# Patient Record
Sex: MALE | Race: WHITE
[De-identification: names, ages, dates, MRNs, and addresses within clinical notes are randomized per-mention and may not be internally consistent; named-entity substitution may affect disease eponyms.]

---

## 2018-11-01 ENCOUNTER — HOSPITAL ENCOUNTER (EMERGENCY)
Dept: HOSPITAL 19 - COL.ER | Age: 23
Discharge: HOME | End: 2018-11-01
Payer: MEDICAID

## 2018-11-01 VITALS — TEMPERATURE: 99.1 F | DIASTOLIC BLOOD PRESSURE: 84 MMHG | SYSTOLIC BLOOD PRESSURE: 148 MMHG

## 2018-11-01 VITALS — HEIGHT: 67.99 IN | BODY MASS INDEX: 34.92 KG/M2 | WEIGHT: 230.38 LBS

## 2018-11-01 VITALS — HEART RATE: 116 BPM

## 2018-11-01 DIAGNOSIS — N45.1: Primary | ICD-10-CM

## 2018-11-01 LAB
BASOPHILS # BLD: 0.1 10*3/UL (ref 0–0.2)
BASOPHILS NFR BLD AUTO: 0.3 % (ref 0–2)
EOSINOPHIL # BLD: 0.2 10*3/UL (ref 0–0.7)
EOSINOPHIL NFR BLD: 0.8 % (ref 0–4)
ERYTHROCYTE [DISTWIDTH] IN BLOOD BY AUTOMATED COUNT: 13.5 % (ref 11.5–14.5)
GRANULOCYTES # BLD AUTO: 79.9 % (ref 42.2–75.2)
HCT VFR BLD AUTO: 46.2 % (ref 42–52)
HGB BLD-MCNC: 15.2 G/DL (ref 13.5–18)
LYMPHOCYTES # BLD: 1.9 10*3/UL (ref 1.2–3.4)
LYMPHOCYTES NFR BLD: 9.2 % (ref 20–51)
MCH RBC QN AUTO: 28 PG (ref 27–31)
MCHC RBC AUTO-ENTMCNC: 33 G/DL (ref 33–37)
MCV RBC AUTO: 86 FL (ref 80–100)
MONOCYTES # BLD: 1.9 10*3/UL (ref 0.1–0.6)
MONOCYTES NFR BLD AUTO: 9.4 % (ref 1.7–9.3)
NEUTROPHILS # BLD: 16.4 10*3/UL (ref 1.4–6.5)
PH UR STRIP.AUTO: 6 [PH] (ref 5–8)
PLATELET # BLD AUTO: 261 K/MM3 (ref 130–400)
PMV BLD AUTO: 9.9 FL (ref 7.4–10.4)
RBC # BLD AUTO: 5.36 M/MM3 (ref 4.2–5.6)
RBC # UR: (no result) /HPF
SP GR UR STRIP.AUTO: 1.03 (ref 1–1.03)
URN COLLECT METHOD CLASS: (no result)
UROBILINOGEN UR STRIP.AUTO-MCNC: 2 MG/DL

## 2019-02-08 ENCOUNTER — HOSPITAL ENCOUNTER (EMERGENCY)
Dept: HOSPITAL 19 - COL.ER | Age: 24
Discharge: HOME | End: 2019-02-08
Payer: SELF-PAY

## 2019-02-08 VITALS — HEART RATE: 97 BPM | DIASTOLIC BLOOD PRESSURE: 98 MMHG | SYSTOLIC BLOOD PRESSURE: 120 MMHG

## 2019-02-08 VITALS — HEIGHT: 67.99 IN | WEIGHT: 220.46 LBS | BODY MASS INDEX: 33.41 KG/M2

## 2019-02-08 VITALS — TEMPERATURE: 97.9 F
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
## 2019-02-08 DIAGNOSIS — S40.011A: Primary | ICD-10-CM
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
## 2019-04-11 ENCOUNTER — HOSPITAL ENCOUNTER (EMERGENCY)
Dept: HOSPITAL 19 - COL.ER | Age: 24
Discharge: HOME | End: 2019-04-11
Payer: SELF-PAY

## 2019-04-11 VITALS — TEMPERATURE: 98.7 F | SYSTOLIC BLOOD PRESSURE: 147 MMHG | DIASTOLIC BLOOD PRESSURE: 86 MMHG

## 2019-04-11 VITALS — BODY MASS INDEX: 33.41 KG/M2 | WEIGHT: 220.46 LBS | HEIGHT: 67.99 IN

## 2019-04-11 VITALS — HEART RATE: 89 BPM
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
## 2019-04-11 DIAGNOSIS — F17.210: ICD-10-CM

## 2019-04-11 DIAGNOSIS — Z23: ICD-10-CM
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
## 2019-04-11 DIAGNOSIS — S80.212A: ICD-10-CM

## 2019-04-11 DIAGNOSIS — F90.9: ICD-10-CM

## 2019-04-11 DIAGNOSIS — S50.312A: ICD-10-CM
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
## 2019-04-11 DIAGNOSIS — S01.01XA: Primary | ICD-10-CM

## 2021-05-13 ENCOUNTER — HOSPITAL ENCOUNTER (EMERGENCY)
Dept: HOSPITAL 19 - COL.ER | Age: 26
Discharge: HOME | End: 2021-05-13
Attending: EMERGENCY MEDICINE
Payer: SELF-PAY

## 2021-05-13 VITALS — HEART RATE: 66 BPM | SYSTOLIC BLOOD PRESSURE: 139 MMHG | DIASTOLIC BLOOD PRESSURE: 116 MMHG

## 2021-05-13 VITALS — BODY MASS INDEX: 33.41 KG/M2 | WEIGHT: 220.46 LBS | HEIGHT: 67.99 IN

## 2021-05-13 VITALS — TEMPERATURE: 98.1 F

## 2021-05-13 DIAGNOSIS — R74.01: ICD-10-CM

## 2021-05-13 DIAGNOSIS — R74.8: ICD-10-CM

## 2021-05-13 DIAGNOSIS — K80.80: Primary | ICD-10-CM

## 2021-05-13 DIAGNOSIS — F17.210: ICD-10-CM

## 2021-05-13 DIAGNOSIS — Z88.1: ICD-10-CM

## 2021-05-13 LAB
ALBUMIN SERPL-MCNC: 4.4 GM/DL (ref 3.5–5)
ALP SERPL-CCNC: 91 U/L (ref 50–136)
ALT SERPL-CCNC: 46 U/L (ref 4–49)
ANION GAP SERPL CALC-SCNC: 9 MMOL/L (ref 7–16)
AST SERPL-CCNC: 55 U/L (ref 15–37)
BASOPHILS # BLD: 0.1 10*3/UL (ref 0–0.2)
BASOPHILS NFR BLD AUTO: 0.8 % (ref 0–2)
BILIRUB SERPL-MCNC: 0.7 MG/DL (ref 0–1)
BUN SERPL-MCNC: 10 MG/DL (ref 9–20)
CALCIUM SERPL-MCNC: 9.3 MG/DL (ref 8.4–10.2)
CHLORIDE SERPL-SCNC: 104 MMOL/L (ref 98–107)
CO2 SERPL-SCNC: 23 MMOL/L (ref 22–30)
CREAT SERPL-SCNC: 0.72 UMOL/L (ref 0.66–1.25)
EOSINOPHIL # BLD: 0.4 10*3/UL (ref 0–0.7)
EOSINOPHIL NFR BLD: 4.7 % (ref 0–4)
ERYTHROCYTE [DISTWIDTH] IN BLOOD BY AUTOMATED COUNT: 13.7 % (ref 11.5–14.5)
GLUCOSE SERPL-MCNC: 113 MG/DL (ref 74–106)
GRANULOCYTES # BLD AUTO: 66.4 % (ref 42.2–75.2)
HCT VFR BLD AUTO: 46.2 % (ref 42–52)
HGB BLD-MCNC: 15.4 G/DL (ref 13.5–18)
LIPASE SERPL-CCNC: 407 U/L (ref 23–300)
LYMPHOCYTES # BLD: 1.7 10*3/UL (ref 1.2–3.4)
LYMPHOCYTES NFR BLD: 18.9 % (ref 20–51)
MCH RBC QN AUTO: 29 PG (ref 27–31)
MCHC RBC AUTO-ENTMCNC: 33 G/DL (ref 33–37)
MCV RBC AUTO: 86 FL (ref 80–100)
MONOCYTES # BLD: 0.8 10*3/UL (ref 0.1–0.6)
MONOCYTES NFR BLD AUTO: 8.9 % (ref 1.7–9.3)
NEUTROPHILS # BLD: 6 10*3/UL (ref 1.4–6.5)
PH UR STRIP.AUTO: 7 [PH] (ref 5–8)
PLATELET # BLD AUTO: 222 K/MM3 (ref 130–400)
PMV BLD AUTO: 9.6 FL (ref 7.4–10.4)
POTASSIUM SERPL-SCNC: 4.2 MMOL/L (ref 3.4–5)
PROT SERPL-MCNC: 7.9 GM/DL (ref 6.4–8.2)
RBC # BLD AUTO: 5.36 M/MM3 (ref 4.2–5.6)
RBC # UR: (no result) /HPF
SODIUM SERPL-SCNC: 136 MMOL/L (ref 137–145)
SP GR UR STRIP.AUTO: 1.02 (ref 1–1.03)
UA DIPSTICK PNL UR STRIP.AUTO: (no result)
URN COLLECT METHOD CLASS: (no result)
UROBILINOGEN UR STRIP.AUTO-MCNC: 2 MG/DL

## 2021-05-21 ENCOUNTER — HOSPITAL ENCOUNTER (OUTPATIENT)
Dept: HOSPITAL 19 - SDCO | Age: 26
Discharge: HOME | End: 2021-05-21
Attending: SURGERY
Payer: SELF-PAY

## 2021-05-21 VITALS — HEART RATE: 89 BPM | SYSTOLIC BLOOD PRESSURE: 160 MMHG | DIASTOLIC BLOOD PRESSURE: 94 MMHG

## 2021-05-21 VITALS — HEART RATE: 91 BPM | SYSTOLIC BLOOD PRESSURE: 151 MMHG | DIASTOLIC BLOOD PRESSURE: 90 MMHG

## 2021-05-21 VITALS — DIASTOLIC BLOOD PRESSURE: 97 MMHG | TEMPERATURE: 97.7 F | SYSTOLIC BLOOD PRESSURE: 148 MMHG | HEART RATE: 83 BPM

## 2021-05-21 VITALS — WEIGHT: 221.12 LBS | HEIGHT: 68 IN | BODY MASS INDEX: 33.51 KG/M2

## 2021-05-21 VITALS — SYSTOLIC BLOOD PRESSURE: 136 MMHG | TEMPERATURE: 97.7 F | DIASTOLIC BLOOD PRESSURE: 79 MMHG | HEART RATE: 62 BPM

## 2021-05-21 VITALS — DIASTOLIC BLOOD PRESSURE: 101 MMHG | SYSTOLIC BLOOD PRESSURE: 151 MMHG | HEART RATE: 92 BPM

## 2021-05-21 DIAGNOSIS — F17.210: ICD-10-CM

## 2021-05-21 DIAGNOSIS — K80.10: Primary | ICD-10-CM

## 2021-05-21 DIAGNOSIS — Z20.822: ICD-10-CM

## 2021-05-21 LAB
ALBUMIN SERPL-MCNC: 4.2 GM/DL (ref 3.5–5)
ALP SERPL-CCNC: 74 U/L (ref 50–136)
ALT SERPL-CCNC: 36 U/L (ref 4–49)
ANION GAP SERPL CALC-SCNC: 5 MMOL/L (ref 7–16)
AST SERPL-CCNC: 32 U/L (ref 15–37)
BASOPHILS # BLD: 0.1 10*3/UL (ref 0–0.2)
BASOPHILS NFR BLD AUTO: 0.8 % (ref 0–2)
BILIRUB SERPL-MCNC: 0.7 MG/DL (ref 0–1)
BUN SERPL-MCNC: 15 MG/DL (ref 9–20)
CALCIUM SERPL-MCNC: 9.1 MG/DL (ref 8.4–10.2)
CHLORIDE SERPL-SCNC: 108 MMOL/L (ref 98–107)
CO2 SERPL-SCNC: 26 MMOL/L (ref 22–30)
CREAT SERPL-SCNC: 0.79 UMOL/L (ref 0.66–1.25)
EOSINOPHIL # BLD: 0.3 10*3/UL (ref 0–0.7)
EOSINOPHIL NFR BLD: 3.8 % (ref 0–4)
ERYTHROCYTE [DISTWIDTH] IN BLOOD BY AUTOMATED COUNT: 14.1 % (ref 11.5–14.5)
GLUCOSE SERPL-MCNC: 97 MG/DL (ref 74–106)
GRANULOCYTES # BLD AUTO: 62.8 % (ref 42.2–75.2)
HCT VFR BLD AUTO: 43.9 % (ref 42–52)
HGB BLD-MCNC: 14.1 G/DL (ref 13.5–18)
LIPASE SERPL-CCNC: 307 U/L (ref 23–300)
LYMPHOCYTES # BLD: 1.8 10*3/UL (ref 1.2–3.4)
LYMPHOCYTES NFR BLD: 21.1 % (ref 20–51)
MCH RBC QN AUTO: 29 PG (ref 27–31)
MCHC RBC AUTO-ENTMCNC: 32 G/DL (ref 33–37)
MCV RBC AUTO: 90 FL (ref 80–100)
MONOCYTES # BLD: 1 10*3/UL (ref 0.1–0.6)
MONOCYTES NFR BLD AUTO: 11.3 % (ref 1.7–9.3)
NEUTROPHILS # BLD: 5.5 10*3/UL (ref 1.4–6.5)
PLATELET # BLD AUTO: 278 K/MM3 (ref 130–400)
PMV BLD AUTO: 9.9 FL (ref 7.4–10.4)
POTASSIUM SERPL-SCNC: 4.4 MMOL/L (ref 3.4–5)
PROT SERPL-MCNC: 7.4 GM/DL (ref 6.4–8.2)
RBC # BLD AUTO: 4.88 M/MM3 (ref 4.2–5.6)
SODIUM SERPL-SCNC: 139 MMOL/L (ref 137–145)

## 2021-05-21 NOTE — NUR
Pt to [facility] [place] bay 5 via cart from PACU. Pt awake and alert. Denies pain or nausea.
Bandaids x4 to abdomen are clean, dry, and intact. Pudding and water given per
pt request. Mother in room. Will continue to monitor. Call light within reach.

## 2021-10-06 ENCOUNTER — HOSPITAL ENCOUNTER (EMERGENCY)
Dept: HOSPITAL 19 - COL.ER | Age: 26
Discharge: HOME | End: 2021-10-06
Attending: EMERGENCY MEDICINE
Payer: SELF-PAY

## 2021-10-06 VITALS — TEMPERATURE: 97.9 F

## 2021-10-06 VITALS — DIASTOLIC BLOOD PRESSURE: 119 MMHG | SYSTOLIC BLOOD PRESSURE: 162 MMHG | HEART RATE: 96 BPM

## 2021-10-06 VITALS — HEIGHT: 67.99 IN | BODY MASS INDEX: 32.64 KG/M2 | WEIGHT: 215.39 LBS

## 2021-10-06 DIAGNOSIS — F17.200: ICD-10-CM

## 2021-10-06 DIAGNOSIS — K85.90: Primary | ICD-10-CM

## 2021-10-06 DIAGNOSIS — Z20.822: ICD-10-CM

## 2021-10-06 LAB
ALBUMIN SERPL-MCNC: 4.3 GM/DL (ref 3.5–5)
ALP SERPL-CCNC: 97 U/L (ref 0–750)
ALT SERPL-CCNC: 41 U/L (ref 0–55)
ANION GAP SERPL CALC-SCNC: 11 MMOL/L (ref 7–16)
AST SERPL-CCNC: 24 U/L (ref 5–34)
BASOPHILS # BLD: 0.1 K/MM3 (ref 0–0.2)
BASOPHILS NFR BLD AUTO: 0.4 % (ref 0–2)
BILIRUB SERPL-MCNC: 1.3 MG/DL (ref 0.2–1.2)
BUN SERPL-MCNC: 12 MG/DL (ref 9–21)
CALCIUM SERPL-MCNC: 10.2 MG/DL (ref 8.4–10.2)
CHLORIDE SERPL-SCNC: 106 MMOL/L (ref 98–107)
CHOLEST SPEC-SCNC: 161 MG/DL (ref 0–199)
CHOLEST/HDLC SERPL-SRTO: 2.9
CO2 SERPL-SCNC: 21 MMOL/L (ref 22–29)
CREAT SERPL-SCNC: 0.88 MG/DL (ref 0.72–1.25)
EOSINOPHIL # BLD: 0.2 K/MM3 (ref 0–0.7)
EOSINOPHIL NFR BLD: 1.6 % (ref 0–4)
ERYTHROCYTE [DISTWIDTH] IN BLOOD BY AUTOMATED COUNT: 13.9 % (ref 11.5–14.5)
GLUCOSE SERPL-MCNC: 111 MG/DL (ref 70–99)
GRANULOCYTES # BLD AUTO: 76.8 % (ref 42.2–75.2)
HCT VFR BLD AUTO: 47.5 % (ref 42–52)
HDLC SERPL-MCNC: 55 MG/DL (ref 40–60)
HGB BLD-MCNC: 15.4 G/DL (ref 13.5–18)
LDLC SERPL-MCNC: 63 MG/DL
LIPASE SERPL-CCNC: 692 U/L (ref 8–78)
LYMPHOCYTES # BLD: 1.6 K/MM3 (ref 1.2–3.4)
LYMPHOCYTES NFR BLD: 11.7 % (ref 20–51)
MCH RBC QN AUTO: 29 PG (ref 27–31)
MCHC RBC AUTO-ENTMCNC: 32 G/DL (ref 33–37)
MCV RBC AUTO: 88 FL (ref 80–100)
MONOCYTES # BLD: 1.2 K/MM3 (ref 0.1–0.6)
MONOCYTES NFR BLD AUTO: 9.2 % (ref 1.7–9.3)
NEUTROPHILS # BLD: 10.2 K/MM3 (ref 1.4–6.5)
PH UR STRIP.AUTO: 6 [PH] (ref 5–8)
PLATELET # BLD AUTO: 255 K/MM3 (ref 130–400)
PMV BLD AUTO: 9.8 FL (ref 7.4–10.4)
POTASSIUM SERPL-SCNC: 4.1 MMOL/L (ref 3.5–4.5)
PROT SERPL-MCNC: 7.7 GM/DL (ref 6.2–8.1)
RBC # BLD AUTO: 5.4 M/MM3 (ref 4.2–5.6)
RBC # UR: (no result) /HPF
SODIUM SERPL-SCNC: 138 MMOL/L (ref 136–145)
SP GR UR STRIP.AUTO: 1.04 (ref 1–1.03)
SQUAMOUS # URNS: (no result) /HPF
TRIGL SERPL-MCNC: 215 MG/DL (ref 0–149)
URN COLLECT METHOD CLASS: (no result)

## 2021-11-15 ENCOUNTER — HOSPITAL ENCOUNTER (INPATIENT)
Dept: HOSPITAL 19 - COL.ER | Age: 26
LOS: 4 days | Discharge: LEFT BEFORE DISCHARGE | DRG: 439 | End: 2021-11-19
Attending: INTERNAL MEDICINE | Admitting: INTERNAL MEDICINE
Payer: SELF-PAY

## 2021-11-15 VITALS — WEIGHT: 215.39 LBS | BODY MASS INDEX: 32.64 KG/M2 | HEIGHT: 67.99 IN

## 2021-11-15 VITALS — TEMPERATURE: 98.6 F | HEART RATE: 129 BPM | DIASTOLIC BLOOD PRESSURE: 108 MMHG | SYSTOLIC BLOOD PRESSURE: 166 MMHG

## 2021-11-15 VITALS — DIASTOLIC BLOOD PRESSURE: 98 MMHG | SYSTOLIC BLOOD PRESSURE: 155 MMHG | HEART RATE: 119 BPM | TEMPERATURE: 99.1 F

## 2021-11-15 DIAGNOSIS — K85.20: Primary | ICD-10-CM

## 2021-11-15 DIAGNOSIS — E87.6: ICD-10-CM

## 2021-11-15 DIAGNOSIS — F17.210: ICD-10-CM

## 2021-11-15 DIAGNOSIS — R00.0: ICD-10-CM

## 2021-11-15 DIAGNOSIS — R18.8: ICD-10-CM

## 2021-11-15 DIAGNOSIS — R19.7: ICD-10-CM

## 2021-11-15 DIAGNOSIS — F10.139: ICD-10-CM

## 2021-11-15 DIAGNOSIS — J90: ICD-10-CM

## 2021-11-15 LAB
ALBUMIN SERPL-MCNC: 4.4 GM/DL (ref 3.5–5)
ALP SERPL-CCNC: 97 U/L (ref 40–150)
ALT SERPL-CCNC: 22 U/L (ref 0–55)
ANION GAP SERPL CALC-SCNC: 17 MMOL/L (ref 7–16)
AST SERPL-CCNC: 22 U/L (ref 5–34)
BASOPHILS # BLD: 0.1 K/MM3 (ref 0–0.2)
BASOPHILS NFR BLD AUTO: 0.4 % (ref 0–2)
BILIRUB SERPL-MCNC: 2.1 MG/DL (ref 0.2–1.2)
BUN SERPL-MCNC: 14 MG/DL (ref 9–21)
CALCIUM SERPL-MCNC: 10.2 MG/DL (ref 8.4–10.2)
CHLORIDE SERPL-SCNC: 106 MMOL/L (ref 98–107)
CO2 SERPL-SCNC: 17 MMOL/L (ref 22–29)
CREAT SERPL-SCNC: 0.93 MG/DL (ref 0.72–1.25)
CRP SERPL-MCNC: 3.2 MG/DL (ref 0–0.5)
EOSINOPHIL # BLD: 0.1 K/MM3 (ref 0–0.7)
EOSINOPHIL NFR BLD: 0.3 % (ref 0–4)
ERYTHROCYTE [DISTWIDTH] IN BLOOD BY AUTOMATED COUNT: 14.4 % (ref 11.5–14.5)
GLUCOSE SERPL-MCNC: 138 MG/DL (ref 70–99)
GRANULOCYTES # BLD AUTO: 86.4 % (ref 42.2–75.2)
HCT VFR BLD AUTO: 50.9 % (ref 42–52)
HGB BLD-MCNC: 16.9 G/DL (ref 13.5–18)
LYMPHOCYTES # BLD: 1.5 K/MM3 (ref 1.2–3.4)
LYMPHOCYTES NFR BLD: 6.6 % (ref 20–51)
MCH RBC QN AUTO: 29 PG (ref 27–31)
MCHC RBC AUTO-ENTMCNC: 33 G/DL (ref 33–37)
MCV RBC AUTO: 86 FL (ref 80–100)
MONOCYTES # BLD: 1.3 K/MM3 (ref 0.1–0.6)
MONOCYTES NFR BLD AUTO: 5.8 % (ref 1.7–9.3)
NEUTROPHILS # BLD: 19.6 K/MM3 (ref 1.4–6.5)
PLATELET # BLD AUTO: 312 K/MM3 (ref 130–400)
PMV BLD AUTO: 9.5 FL (ref 7.4–10.4)
POTASSIUM SERPL-SCNC: 3.1 MMOL/L (ref 3.5–4.5)
PROT SERPL-MCNC: 8.3 GM/DL (ref 6.2–8.1)
RBC # BLD AUTO: 5.94 M/MM3 (ref 4.2–5.6)
SODIUM SERPL-SCNC: 140 MMOL/L (ref 136–145)

## 2021-11-16 VITALS — HEART RATE: 116 BPM | DIASTOLIC BLOOD PRESSURE: 99 MMHG | SYSTOLIC BLOOD PRESSURE: 141 MMHG | TEMPERATURE: 98.7 F

## 2021-11-16 VITALS — HEART RATE: 138 BPM | TEMPERATURE: 98.7 F | DIASTOLIC BLOOD PRESSURE: 118 MMHG | SYSTOLIC BLOOD PRESSURE: 161 MMHG

## 2021-11-16 VITALS — SYSTOLIC BLOOD PRESSURE: 160 MMHG | TEMPERATURE: 98.1 F | HEART RATE: 124 BPM | DIASTOLIC BLOOD PRESSURE: 101 MMHG

## 2021-11-16 VITALS — SYSTOLIC BLOOD PRESSURE: 150 MMHG | HEART RATE: 122 BPM | DIASTOLIC BLOOD PRESSURE: 91 MMHG | TEMPERATURE: 98.3 F

## 2021-11-16 VITALS — TEMPERATURE: 100.8 F | SYSTOLIC BLOOD PRESSURE: 167 MMHG | HEART RATE: 137 BPM | DIASTOLIC BLOOD PRESSURE: 109 MMHG

## 2021-11-16 VITALS — SYSTOLIC BLOOD PRESSURE: 147 MMHG | TEMPERATURE: 99.5 F | HEART RATE: 129 BPM | DIASTOLIC BLOOD PRESSURE: 90 MMHG

## 2021-11-16 LAB
ANION GAP SERPL CALC-SCNC: 11 MMOL/L (ref 7–16)
BASOPHILS # BLD: 0.1 K/MM3 (ref 0–0.2)
BASOPHILS NFR BLD AUTO: 0.3 % (ref 0–2)
BUN SERPL-MCNC: 8 MG/DL (ref 9–21)
CALCIUM SERPL-MCNC: 8.8 MG/DL (ref 8.4–10.2)
CHLORIDE SERPL-SCNC: 106 MMOL/L (ref 98–107)
CO2 SERPL-SCNC: 21 MMOL/L (ref 22–29)
CREAT SERPL-SCNC: 0.85 MG/DL (ref 0.72–1.25)
EOSINOPHIL # BLD: 0 K/MM3 (ref 0–0.7)
EOSINOPHIL NFR BLD: 0 % (ref 0–4)
ERYTHROCYTE [DISTWIDTH] IN BLOOD BY AUTOMATED COUNT: 14.6 % (ref 11.5–14.5)
GLUCOSE SERPL-MCNC: 121 MG/DL (ref 70–99)
GRANULOCYTES # BLD AUTO: 89.1 % (ref 42.2–75.2)
HCT VFR BLD AUTO: 47.3 % (ref 42–52)
HGB BLD-MCNC: 15.5 G/DL (ref 13.5–18)
LYMPHOCYTES # BLD: 0.7 K/MM3 (ref 1.2–3.4)
LYMPHOCYTES NFR BLD: 3.6 % (ref 20–51)
MCH RBC QN AUTO: 28 PG (ref 27–31)
MCHC RBC AUTO-ENTMCNC: 33 G/DL (ref 33–37)
MCV RBC AUTO: 86 FL (ref 80–100)
MONOCYTES # BLD: 1.2 K/MM3 (ref 0.1–0.6)
MONOCYTES NFR BLD AUTO: 6.4 % (ref 1.7–9.3)
NEUTROPHILS # BLD: 16.5 K/MM3 (ref 1.4–6.5)
PLATELET # BLD AUTO: 225 K/MM3 (ref 130–400)
PMV BLD AUTO: 9.8 FL (ref 7.4–10.4)
POTASSIUM SERPL-SCNC: 4.6 MMOL/L (ref 3.5–4.5)
RBC # BLD AUTO: 5.51 M/MM3 (ref 4.2–5.6)
SODIUM SERPL-SCNC: 138 MMOL/L (ref 136–145)

## 2021-11-16 NOTE — NUR
PT WAS IN AND OUT OF SLEEP ALL NIGHT. PT REMAINED HYPERTENSIVE. PT PAIN
REMAINS HIGH AND REQUIRES THE EVERY 2 HOURS DILIUADID. PT FINISHED THE BAGS OF
POTASSIUM, HAS BANANA BAG RUNNING CURRENTLY. NS WILL BE MAINTENANCE FLUIDS
AFTER BANANA BAG FINISHES. WILL TELL DAY SHIFT RN THAT BAGS WILL NEED RETIMED.
PT STATES VOMIT IS NOW ONLY SPIT, BUT STILL NAUSEOUS. DENIES ZOFRAN AT THIS
TIME. THIS RN OFFERED BROTH TO PT, PT DENIED. PT IS PLEASANT, TOOK MEDICATIONS
AS ORDERED.

## 2021-11-16 NOTE — NUR
Evening vital signs not WNL, HR 130s-140s, BP elevated, and temp elevated. POLO Hall notified on telephone, new orders obtained.

## 2021-11-16 NOTE — NUR
Report received from MEGAN Betancourt. Pt. resting in bed w/ eyes closed at this time.
Call light and belongings in reach.

## 2021-11-16 NOTE — NUR
met with patient to discuss discharge plan. Patient states that
he lives with his parents at their home in [place]. Patient is fully
independent with his activities of daily living and does not utilize any
medical equipment to assist with mobility. Patient has no O2 needs. PCP is
 at [facility] and he utilizes [facility] for
perscriptions. Patient reports that he does not have trouble affording his
medications. Patient is unmarried and has no children. Education provided to
the patient surrounding what a DPOA-HC is and that if he was not to fill out a
form that health care decisions would fall to his parents. Patient verbalized
his understanding of this and does not wish to complete form at this time.
Patient is not interested in treatment for alcohol stating " ican stop on my
own". Patient is planning on returning home post dc.
 
Discharge plan: Home with parents.

## 2021-11-17 VITALS — HEART RATE: 137 BPM | TEMPERATURE: 98.4 F | DIASTOLIC BLOOD PRESSURE: 107 MMHG | SYSTOLIC BLOOD PRESSURE: 179 MMHG

## 2021-11-17 VITALS — SYSTOLIC BLOOD PRESSURE: 174 MMHG | TEMPERATURE: 99.4 F | HEART RATE: 122 BPM | DIASTOLIC BLOOD PRESSURE: 97 MMHG

## 2021-11-17 VITALS — HEART RATE: 138 BPM | TEMPERATURE: 98.5 F | SYSTOLIC BLOOD PRESSURE: 155 MMHG | DIASTOLIC BLOOD PRESSURE: 111 MMHG

## 2021-11-17 VITALS — DIASTOLIC BLOOD PRESSURE: 94 MMHG | SYSTOLIC BLOOD PRESSURE: 142 MMHG | TEMPERATURE: 99 F | HEART RATE: 146 BPM

## 2021-11-17 VITALS — DIASTOLIC BLOOD PRESSURE: 110 MMHG | TEMPERATURE: 99 F | SYSTOLIC BLOOD PRESSURE: 162 MMHG | HEART RATE: 134 BPM

## 2021-11-17 VITALS — HEART RATE: 134 BPM | DIASTOLIC BLOOD PRESSURE: 96 MMHG | TEMPERATURE: 98.3 F | SYSTOLIC BLOOD PRESSURE: 149 MMHG

## 2021-11-17 VITALS — HEART RATE: 138 BPM | DIASTOLIC BLOOD PRESSURE: 110 MMHG | SYSTOLIC BLOOD PRESSURE: 153 MMHG | TEMPERATURE: 99.5 F

## 2021-11-17 VITALS — HEART RATE: 138 BPM | DIASTOLIC BLOOD PRESSURE: 115 MMHG | SYSTOLIC BLOOD PRESSURE: 159 MMHG | TEMPERATURE: 98.5 F

## 2021-11-17 LAB
ANION GAP SERPL CALC-SCNC: 10 MMOL/L (ref 7–16)
BUN SERPL-MCNC: 6 MG/DL (ref 9–21)
CALCIUM SERPL-MCNC: 8.5 MG/DL (ref 8.4–10.2)
CHLORIDE SERPL-SCNC: 106 MMOL/L (ref 98–107)
CO2 SERPL-SCNC: 18 MMOL/L (ref 22–29)
CREAT SERPL-SCNC: 0.73 MG/DL (ref 0.72–1.25)
DRUGS UR SCN NOM: NEGATIVE NG/ML
ERYTHROCYTE [DISTWIDTH] IN BLOOD BY AUTOMATED COUNT: 14.4 % (ref 11.5–14.5)
GLUCOSE SERPL-MCNC: 110 MG/DL (ref 70–99)
HCT VFR BLD AUTO: 42.8 % (ref 42–52)
HGB BLD-MCNC: 14.2 G/DL (ref 13.5–18)
KETONES UR STRIP.AUTO-MCNC: (no result) MG/DL
LYMPHOCYTES NFR BLD MANUAL: 4 % (ref 20–51)
MCH RBC QN AUTO: 29 PG (ref 27–31)
MCHC RBC AUTO-ENTMCNC: 33 G/DL (ref 33–37)
MCV RBC AUTO: 87 FL (ref 80–100)
MONOCYTES NFR BLD: 10 % (ref 1.7–9.3)
NEUTS BAND NFR BLD: 16 % (ref 0–10)
NEUTS SEG NFR BLD MANUAL: 70 % (ref 42–75.2)
PH UR STRIP.AUTO: 5 [PH] (ref 5–8)
PLATELET # BLD AUTO: 197 K/MM3 (ref 130–400)
PLATELET BLD QL SMEAR: NORMAL
PMV BLD AUTO: 10.3 FL (ref 7.4–10.4)
POTASSIUM SERPL-SCNC: 3.5 MMOL/L (ref 3.5–4.5)
RBC # BLD AUTO: 4.94 M/MM3 (ref 4.2–5.6)
RBC # UR STRIP.AUTO: (no result) /UL
RBC # UR: (no result) /HPF (ref 0–2)
SODIUM SERPL-SCNC: 134 MMOL/L (ref 136–145)
SP GR UR STRIP.AUTO: 1.02 (ref 1–1.03)
SQUAMOUS # URNS: (no result) /HPF (ref 0–10)
UA DIPSTICK PNL UR STRIP.AUTO: (no result)
URN COLLECT METHOD CLASS: (no result)
UROBILINOGEN UR STRIP.AUTO-MCNC: >=4 MG/DL

## 2021-11-17 NOTE — NUR
PT A/O, VITALS STABLE. HYPERTENSION AND TACHYCARDIA WHEN PT SCORES 5-9 ON CIWA
SCALE AND WHEN COMPLAINING OF PAIN. PT CALLS OUT
RATING PAIN 6-10, DILUDID GIVEN WITH
LITTLE IMPROVEMENT PER PATIENT BUT WHEN ROUNDED ON PATIENT WOULD BE ASLEEP. 3
MG OF ATIVAN GIVEN ON SHIFT. NO BM, MULTIPLE VOIDS, MINIMAL ORAL INTAKE. SEE
MAR/EMAR FOR OTHER DETAILS.

## 2021-11-17 NOTE — NUR
AM assessment complete and AM meds administered. See charting for CIWA
protocol in process interventions. Pt. denies additional needs at this time,
call light and belongings in reach.

## 2021-11-17 NOTE — NUR
Pt.'s BP and HR elevated. PRN administered w/ minimal effect. POLO Luciano notified of pt.'s abnormal vital signs. New orders obtained. Pt.
denies any s/s elevated HR and denies s/s elevated BP.

## 2021-11-17 NOTE — NUR
Patient assessed around 1950. Complained of level 7 pain to abdomen at that
time. Too early to give medication. Given PRN Dilaudid for pain around 2145.
Also given 2 mg Ativan per detox protocol at that time. Peripheral IV to right
AC with fluids running per orders. Denies SOB and dyspnea. LS CTA.
Respirations even and unlabored. HR-tachycardia. Telemetry in place: sinus
tachycardia, 130-140s. ARNP aware, given medication per orders. BSAx4. No
edema. Voices no questions, needs, or concerns at this time. In bed with call
light within reach.

## 2021-11-17 NOTE — NUR
Pt.'s HR increasingly getting higher this evening, -148. Pt. reports
pain feels more tender to the left side today. NP Ilene notified on the
telephone, new orders obtained for stat CT scan. PRN Pain medication due in 10
minutes.

## 2021-11-17 NOTE — NUR
New critical WBC resulted this AM. POLO Hall notified on the telephone. Pt. is
resting in bed and requesting pain medication at this time. Pt. denies further
needs other than pain med request. Call light and belongings in reach.

## 2021-11-18 VITALS — HEART RATE: 142 BPM | DIASTOLIC BLOOD PRESSURE: 81 MMHG | TEMPERATURE: 98.5 F | SYSTOLIC BLOOD PRESSURE: 143 MMHG

## 2021-11-18 VITALS — SYSTOLIC BLOOD PRESSURE: 148 MMHG | HEART RATE: 132 BPM | TEMPERATURE: 98.9 F | DIASTOLIC BLOOD PRESSURE: 102 MMHG

## 2021-11-18 VITALS — DIASTOLIC BLOOD PRESSURE: 100 MMHG | SYSTOLIC BLOOD PRESSURE: 151 MMHG | TEMPERATURE: 99.5 F | HEART RATE: 130 BPM

## 2021-11-18 VITALS — TEMPERATURE: 98.9 F | DIASTOLIC BLOOD PRESSURE: 101 MMHG | HEART RATE: 128 BPM | SYSTOLIC BLOOD PRESSURE: 144 MMHG

## 2021-11-18 VITALS — DIASTOLIC BLOOD PRESSURE: 105 MMHG | TEMPERATURE: 98.9 F | SYSTOLIC BLOOD PRESSURE: 156 MMHG | HEART RATE: 126 BPM

## 2021-11-18 VITALS — DIASTOLIC BLOOD PRESSURE: 92 MMHG | HEART RATE: 134 BPM | SYSTOLIC BLOOD PRESSURE: 149 MMHG | TEMPERATURE: 98.7 F

## 2021-11-18 VITALS — SYSTOLIC BLOOD PRESSURE: 142 MMHG | TEMPERATURE: 99.2 F | DIASTOLIC BLOOD PRESSURE: 93 MMHG | HEART RATE: 125 BPM

## 2021-11-18 VITALS — HEART RATE: 138 BPM | TEMPERATURE: 99.4 F | SYSTOLIC BLOOD PRESSURE: 149 MMHG | DIASTOLIC BLOOD PRESSURE: 99 MMHG

## 2021-11-18 VITALS — DIASTOLIC BLOOD PRESSURE: 87 MMHG | TEMPERATURE: 99.5 F | SYSTOLIC BLOOD PRESSURE: 150 MMHG | HEART RATE: 137 BPM

## 2021-11-18 VITALS — TEMPERATURE: 99.9 F | HEART RATE: 132 BPM

## 2021-11-18 VITALS — TEMPERATURE: 99.9 F | SYSTOLIC BLOOD PRESSURE: 154 MMHG | DIASTOLIC BLOOD PRESSURE: 97 MMHG | HEART RATE: 131 BPM

## 2021-11-18 VITALS — HEART RATE: 132 BPM | DIASTOLIC BLOOD PRESSURE: 89 MMHG | SYSTOLIC BLOOD PRESSURE: 129 MMHG | TEMPERATURE: 97.5 F

## 2021-11-18 VITALS — SYSTOLIC BLOOD PRESSURE: 147 MMHG | HEART RATE: 133 BPM | TEMPERATURE: 100 F | DIASTOLIC BLOOD PRESSURE: 94 MMHG

## 2021-11-18 LAB
ANION GAP SERPL CALC-SCNC: 11 MMOL/L (ref 7–16)
BUN SERPL-MCNC: 5 MG/DL (ref 9–21)
CALCIUM SERPL-MCNC: 8.8 MG/DL (ref 8.4–10.2)
CHLORIDE SERPL-SCNC: 101 MMOL/L (ref 98–107)
CO2 SERPL-SCNC: 20 MMOL/L (ref 22–29)
CREAT SERPL-SCNC: 0.71 MG/DL (ref 0.72–1.25)
ERYTHROCYTE [DISTWIDTH] IN BLOOD BY AUTOMATED COUNT: 14.1 % (ref 11.5–14.5)
GLUCOSE SERPL-MCNC: 100 MG/DL (ref 70–99)
HCT VFR BLD AUTO: 37.1 % (ref 42–52)
HGB BLD-MCNC: 12.6 G/DL (ref 13.5–18)
LYMPHOCYTES NFR BLD MANUAL: 7 % (ref 20–51)
MAGNESIUM SERPL-MCNC: 1.8 MG/DL (ref 1.6–2.6)
MCH RBC QN AUTO: 28 PG (ref 27–31)
MCHC RBC AUTO-ENTMCNC: 34 G/DL (ref 33–37)
MCV RBC AUTO: 83 FL (ref 80–100)
MONOCYTES NFR BLD: 7 % (ref 1.7–9.3)
NEUTS BAND NFR BLD: 19 % (ref 0–10)
NEUTS SEG NFR BLD MANUAL: 67 % (ref 42–75.2)
PLATELET # BLD AUTO: 187 K/MM3 (ref 130–400)
PLATELET BLD QL SMEAR: NORMAL
PMV BLD AUTO: 10.2 FL (ref 7.4–10.4)
POTASSIUM SERPL-SCNC: 3.2 MMOL/L (ref 3.5–4.5)
RBC # BLD AUTO: 4.45 M/MM3 (ref 4.2–5.6)
SODIUM SERPL-SCNC: 132 MMOL/L (ref 136–145)

## 2021-11-18 NOTE — NUR
PT LAYING IN BED. COMPLAINS OF PAIN ON LEFT SIDE. PT STILL TACHYCARDIAC,
SHALLOW BREATHING DURING EXERTION. PT STILL SCORING A 9 ON CIWA SCALE DUE TO
HIGHER BP AND TACHYCARDIA. TEMP AT 99.9. PT STATED HE WANTED TO TRY TO GET
BETTER NIGHT SLEEP, BUT ADMITS PAIN STILL WAKES HIM UP. PT VERBALIZED
UNDERSTANDING OF MEDICATIONS AND VERBALIZES UNDERSTANDING OF CIWA SCORE AND
THAT UNDER ALCOHOL DETOX PROTOCOL. PT CALL LIGHT IN REACH, WATER REFILLED, NO
OTHER NEEDS AT THIS TIME. WILL CONTINUE TO MONITOR HR, BP AND SIGNS OF
WORSENING DETOX SYMPTOMS.

## 2021-11-18 NOTE — NUR
Patient has been receiving PRN Ativan per detox protocol every 2 hours. Has
also been receiving PRN Dilaudid as requested for pain per orders. HR
continues to be in the 120-130s. Denies chest pain and palpitations. ARNP
aware. IV fluids and antibiotics continue per orders. Recieved PRN APAP once
for fever of 100.0. In bed with call light within reach.

## 2021-11-18 NOTE — NUR
Patient complaining of pain. Given PRN Dilaudid as requested. Scored an 8 on
detox protocol. Given PRN Ativan as ordered.

## 2021-11-19 VITALS — TEMPERATURE: 99.4 F | DIASTOLIC BLOOD PRESSURE: 96 MMHG | SYSTOLIC BLOOD PRESSURE: 154 MMHG | HEART RATE: 133 BPM

## 2021-11-19 VITALS — DIASTOLIC BLOOD PRESSURE: 95 MMHG | HEART RATE: 135 BPM | SYSTOLIC BLOOD PRESSURE: 152 MMHG | TEMPERATURE: 99 F

## 2021-11-19 LAB
ANION GAP SERPL CALC-SCNC: 14 MMOL/L (ref 7–16)
BUN SERPL-MCNC: 6 MG/DL (ref 9–21)
CALCIUM SERPL-MCNC: 9.1 MG/DL (ref 8.4–10.2)
CHLORIDE SERPL-SCNC: 99 MMOL/L (ref 98–107)
CO2 SERPL-SCNC: 21 MMOL/L (ref 22–29)
CREAT SERPL-SCNC: 0.69 MG/DL (ref 0.72–1.25)
EOSINOPHIL NFR BLD: 1 % (ref 0–4)
ERYTHROCYTE [DISTWIDTH] IN BLOOD BY AUTOMATED COUNT: 14 % (ref 11.5–14.5)
GLUCOSE SERPL-MCNC: 95 MG/DL (ref 70–99)
HCT VFR BLD AUTO: 35.9 % (ref 42–52)
HGB BLD-MCNC: 12.3 G/DL (ref 13.5–18)
LYMPHOCYTES NFR BLD MANUAL: 4 % (ref 20–51)
MCH RBC QN AUTO: 28 PG (ref 27–31)
MCHC RBC AUTO-ENTMCNC: 34 G/DL (ref 33–37)
MCV RBC AUTO: 83 FL (ref 80–100)
MONOCYTES NFR BLD: 13 % (ref 1.7–9.3)
NEUTS BAND NFR BLD: 23 % (ref 0–10)
NEUTS SEG NFR BLD MANUAL: 59 % (ref 42–75.2)
PLATELET # BLD AUTO: 212 K/MM3 (ref 130–400)
PLATELET BLD QL SMEAR: NORMAL
PMV BLD AUTO: 10.2 FL (ref 7.4–10.4)
POTASSIUM SERPL-SCNC: 3.2 MMOL/L (ref 3.5–4.5)
RBC # BLD AUTO: 4.33 M/MM3 (ref 4.2–5.6)
SODIUM SERPL-SCNC: 134 MMOL/L (ref 136–145)

## 2021-11-19 NOTE — NUR
PT HAD AN UNEVENTFUL NIGHT. PT STILL COMPLAINING OF LEFT SIDED ABDOMINAL PAIN.
PT REMAINED TACHYCARDIAC IN 120s TO 130s. PT REMAINED PLEASANT. DURING SHIFT,
THIS RN HAD TO CHANGE IV SITE DUE TO SUDDEN LEAKAGE FROM ORIGINAL IV SITE. 1
ATTEMPT TO PLACE NEW SITE. PT STILL RECEIVING DILAUIDID EVERY 2-3 HOURS. PT
AMBULATORY TO BATHROOM, STILL STATES NO APPETITE. THIS RN OFFERED WARM BROTH,
PT DECLINED. PT TAKES ALL MEDICATIONS AS PRESCRIBED. DENIES ANY
NASEAU/VOMITTING/ DIARRHEA.

## 2021-11-19 NOTE — NUR
Patient has decided to leave AMA/ I have discussed benefits of stay and
risks of leaving/ he has verbalized understanding and  signed
AMA paperwork, he was unwilling to wait any longer to speak
with provider, I removed his IV and tele, I notified hospitalist that he is
leaving AMA at this time

## 2021-11-19 NOTE — NUR
Patient called nurses station and asked to see me, upon entering the room he
stated that he would like to be discharged and was ready to go home, I
explained to him that I would notify the physician of his wishes and they can
come discuss his medical conditions and plan of care from our standpoint/
wether that would include possible discharge I was not sure, patient then
stated he would leave AMA if not, I notified EVE Marti hospitalist and she
said they would come see him first on their list to discuss plan of care

## 2021-11-22 ENCOUNTER — HOSPITAL ENCOUNTER (INPATIENT)
Dept: HOSPITAL 19 - COL.ER | Age: 26
LOS: 2 days | Discharge: HOME | DRG: 439 | End: 2021-11-24
Attending: INTERNAL MEDICINE | Admitting: INTERNAL MEDICINE
Payer: SELF-PAY

## 2021-11-22 VITALS — WEIGHT: 215.39 LBS | HEIGHT: 72.01 IN | BODY MASS INDEX: 29.17 KG/M2

## 2021-11-22 VITALS — SYSTOLIC BLOOD PRESSURE: 157 MMHG | TEMPERATURE: 98.3 F | HEART RATE: 107 BPM | DIASTOLIC BLOOD PRESSURE: 88 MMHG

## 2021-11-22 DIAGNOSIS — R18.8: ICD-10-CM

## 2021-11-22 DIAGNOSIS — E87.70: ICD-10-CM

## 2021-11-22 DIAGNOSIS — E87.6: ICD-10-CM

## 2021-11-22 DIAGNOSIS — I10: ICD-10-CM

## 2021-11-22 DIAGNOSIS — J98.11: ICD-10-CM

## 2021-11-22 DIAGNOSIS — F17.210: ICD-10-CM

## 2021-11-22 DIAGNOSIS — K85.80: Primary | ICD-10-CM

## 2021-11-22 LAB
ALBUMIN SERPL-MCNC: 2.6 GM/DL (ref 3.5–5)
ALP SERPL-CCNC: 137 U/L (ref 40–150)
ALT SERPL-CCNC: 36 U/L (ref 0–55)
ANION GAP SERPL CALC-SCNC: 13 MMOL/L (ref 7–16)
AST SERPL-CCNC: 32 U/L (ref 5–34)
BILIRUB SERPL-MCNC: 0.5 MG/DL (ref 0.2–1.2)
BUN SERPL-MCNC: 5 MG/DL (ref 9–21)
CALCIUM SERPL-MCNC: 9.5 MG/DL (ref 8.4–10.2)
CHLORIDE SERPL-SCNC: 99 MMOL/L (ref 98–107)
CO2 SERPL-SCNC: 23 MMOL/L (ref 22–29)
CREAT SERPL-SCNC: 0.74 MG/DL (ref 0.72–1.25)
ERYTHROCYTE [DISTWIDTH] IN BLOOD BY AUTOMATED COUNT: 14.9 % (ref 11.5–14.5)
GLUCOSE SERPL-MCNC: 121 MG/DL (ref 70–99)
HCT VFR BLD AUTO: 35.1 % (ref 42–52)
HGB BLD-MCNC: 11.6 G/DL (ref 13.5–18)
LIPASE SERPL-CCNC: 229 U/L (ref 8–78)
LYMPHOCYTES NFR BLD MANUAL: 11 % (ref 20–51)
MAGNESIUM SERPL-MCNC: 2.2 MG/DL (ref 1.6–2.6)
MCH RBC QN AUTO: 28 PG (ref 27–31)
MCHC RBC AUTO-ENTMCNC: 33 G/DL (ref 33–37)
MCV RBC AUTO: 84 FL (ref 80–100)
MONOCYTES NFR BLD: 7 % (ref 1.7–9.3)
NEUTS BAND NFR BLD: 14 % (ref 0–10)
NEUTS SEG NFR BLD MANUAL: 68 % (ref 42–75.2)
PLATELET # BLD AUTO: 394 K/MM3 (ref 130–400)
PLATELET BLD QL SMEAR: NORMAL
PMV BLD AUTO: 10.4 FL (ref 7.4–10.4)
POTASSIUM SERPL-SCNC: 3.5 MMOL/L (ref 3.5–4.5)
PROT SERPL-MCNC: 7.4 GM/DL (ref 6.2–8.1)
RBC # BLD AUTO: 4.16 M/MM3 (ref 4.2–5.6)
SODIUM SERPL-SCNC: 135 MMOL/L (ref 136–145)

## 2021-11-22 NOTE — NUR
PT REPORTS PAIN TO BE 10/10 AFTER DILUDID GIVEN 1 HR AGO. WANDA CUELLAR CALLED X1
TO BE GIVEN NOW 0.5 DILUDID FOR BREAKTHRU. ORDER PLACED.

## 2021-11-23 VITALS — HEART RATE: 103 BPM | DIASTOLIC BLOOD PRESSURE: 110 MMHG | TEMPERATURE: 98.4 F | SYSTOLIC BLOOD PRESSURE: 157 MMHG

## 2021-11-23 VITALS — DIASTOLIC BLOOD PRESSURE: 105 MMHG | HEART RATE: 97 BPM | SYSTOLIC BLOOD PRESSURE: 154 MMHG | TEMPERATURE: 98.3 F

## 2021-11-23 VITALS — DIASTOLIC BLOOD PRESSURE: 94 MMHG | SYSTOLIC BLOOD PRESSURE: 165 MMHG | TEMPERATURE: 97.8 F | HEART RATE: 105 BPM

## 2021-11-23 VITALS — DIASTOLIC BLOOD PRESSURE: 104 MMHG | HEART RATE: 94 BPM | TEMPERATURE: 98 F | SYSTOLIC BLOOD PRESSURE: 154 MMHG

## 2021-11-23 VITALS — DIASTOLIC BLOOD PRESSURE: 104 MMHG | HEART RATE: 93 BPM | SYSTOLIC BLOOD PRESSURE: 151 MMHG | TEMPERATURE: 98.5 F

## 2021-11-23 VITALS — DIASTOLIC BLOOD PRESSURE: 105 MMHG | SYSTOLIC BLOOD PRESSURE: 160 MMHG | HEART RATE: 99 BPM | TEMPERATURE: 97.8 F

## 2021-11-23 LAB
ANION GAP SERPL CALC-SCNC: 12 MMOL/L (ref 7–16)
ANISOCYTOSIS BLD QL: (no result)
BUN SERPL-MCNC: 3 MG/DL (ref 9–21)
CALCIUM SERPL-MCNC: 9.4 MG/DL (ref 8.4–10.2)
CHLORIDE SERPL-SCNC: 99 MMOL/L (ref 98–107)
CO2 SERPL-SCNC: 25 MMOL/L (ref 22–29)
CREAT SERPL-SCNC: 0.68 MG/DL (ref 0.72–1.25)
ERYTHROCYTE [DISTWIDTH] IN BLOOD BY AUTOMATED COUNT: 14.8 % (ref 11.5–14.5)
GLUCOSE SERPL-MCNC: 120 MG/DL (ref 70–99)
HCT VFR BLD AUTO: 33.7 % (ref 42–52)
HGB BLD-MCNC: 11.2 G/DL (ref 13.5–18)
LYMPHOCYTES NFR BLD MANUAL: 21 % (ref 20–51)
MCH RBC QN AUTO: 28 PG (ref 27–31)
MCHC RBC AUTO-ENTMCNC: 33 G/DL (ref 33–37)
MCV RBC AUTO: 83 FL (ref 80–100)
MICROCYTES BLD QL SMEAR: (no result)
MONOCYTES NFR BLD: 10 % (ref 1.7–9.3)
NEUTS SEG NFR BLD MANUAL: 69 % (ref 42–75.2)
PLATELET # BLD AUTO: 335 K/MM3 (ref 130–400)
PLATELET BLD QL SMEAR: NORMAL
PMV BLD AUTO: 10.9 FL (ref 7.4–10.4)
POTASSIUM SERPL-SCNC: 3.7 MMOL/L (ref 3.5–4.5)
RBC # BLD AUTO: 4.05 M/MM3 (ref 4.2–5.6)
SODIUM SERPL-SCNC: 136 MMOL/L (ref 136–145)

## 2021-11-23 NOTE — NUR
Patient alert and oriented, answers questins appropriately.  See assessment.
Abdomen soft, rounded.  Bowel sounds active x4 quads. +Flatus. +Bowel
movement.  Urinating adequate amounts.  No jaundice, sclera clear. Patient
requests this nurse set an alarm so he can receive Dilaudid IV every two
hours, states he wants Dilaudid to be given to him even if he is asleep.
Patient educated on prn pain medications, and nonpharmaceutical interventions
for pain control.  Patient uninterested in education, states he prefers "just
to get the dilaudid".  Dr Burns updated on patient status and narcotic use.  No
other c/o at this time.

## 2021-11-23 NOTE — NUR
PT ADMIT FOR PANCREATSIS. WAS HERE LAST WEEK W SAME ISSUE AND LEFT AMA AFTER
FEELING CONFUSED AND UPSET ABOUT MED REGIMEN WHICH HE STATES WAS A MISTAKE AS
HE HAS INCREASINGLY HURTING MORE IN BACK AND ABDOMEN. HAS NOT HAD ANY ALCHOL
SINCE LAST ADMIT AS WELL. POC DISCUSSED. IV FLUIDS, ANTIBOTICS, PAIN MEDS.
CALL LIGHT WI REACH.

## 2021-11-23 NOTE — NUR
ALERT AND OX4. DENIES SOA, OR CHEST PAIN. RATES PAIN 10/10. NORCO GIVEN
RECENTLY. GAL GORDON. POC DISCUSSED. CALL LIGHT WI REACH. NEEDS MET. No adenopathy or splenomegaly. No cervical or inguinal lymphadenopathy.

## 2021-11-23 NOTE — NUR
met with patient. Patient was here last week for the same
complaint to which he left AMA on 11/19. Patient has had no changes since his
admissions last week. He states that he hadn't been drinking since the last
admission. Patient provided phone number for [facility]. Education
provided to the patient that i was unable to initiate anything for him and
that he would have to be the one to reach out. Patient is appreciative of the
information.

## 2021-11-24 VITALS — HEART RATE: 105 BPM | DIASTOLIC BLOOD PRESSURE: 117 MMHG | SYSTOLIC BLOOD PRESSURE: 165 MMHG | TEMPERATURE: 98 F

## 2021-11-24 VITALS — DIASTOLIC BLOOD PRESSURE: 95 MMHG | SYSTOLIC BLOOD PRESSURE: 168 MMHG

## 2021-11-24 VITALS — HEART RATE: 115 BPM | TEMPERATURE: 98.3 F | DIASTOLIC BLOOD PRESSURE: 97 MMHG | SYSTOLIC BLOOD PRESSURE: 146 MMHG

## 2021-11-24 VITALS — HEART RATE: 111 BPM | DIASTOLIC BLOOD PRESSURE: 93 MMHG | SYSTOLIC BLOOD PRESSURE: 135 MMHG | TEMPERATURE: 98.2 F

## 2021-11-24 LAB
ALBUMIN SERPL-MCNC: 2.6 GM/DL (ref 3.5–5)
ANION GAP SERPL CALC-SCNC: 14 MMOL/L (ref 7–16)
BUN SERPL-MCNC: 5 MG/DL (ref 9–21)
CALCIUM SERPL-MCNC: 8.8 MG/DL (ref 8.4–10.2)
CHLORIDE SERPL-SCNC: 100 MMOL/L (ref 98–107)
CO2 SERPL-SCNC: 22 MMOL/L (ref 22–29)
CREAT SERPL-SCNC: 0.66 MG/DL (ref 0.72–1.25)
EOSINOPHIL NFR BLD: 1 % (ref 0–4)
ERYTHROCYTE [DISTWIDTH] IN BLOOD BY AUTOMATED COUNT: 15.1 % (ref 11.5–14.5)
GLUCOSE SERPL-MCNC: 109 MG/DL (ref 70–99)
HCT VFR BLD AUTO: 35.2 % (ref 42–52)
HGB BLD-MCNC: 11.7 G/DL (ref 13.5–18)
LYMPHOCYTES NFR BLD MANUAL: 7 % (ref 20–51)
MAGNESIUM SERPL-MCNC: 2.1 MG/DL (ref 1.6–2.6)
MCH RBC QN AUTO: 29 PG (ref 27–31)
MCHC RBC AUTO-ENTMCNC: 33 G/DL (ref 33–37)
MCV RBC AUTO: 86 FL (ref 80–100)
METAMYELOCYTES NFR BLD MANUAL: 1 % (ref 0–0)
MONOCYTES NFR BLD: 14 % (ref 1.7–9.3)
MYELOCYTES NFR BLD MANUAL: 1 % (ref 0–0)
NEUTS BAND NFR BLD: 10 % (ref 0–10)
NEUTS SEG NFR BLD MANUAL: 66 % (ref 42–75.2)
PHOSPHATE SERPL-MCNC: 3.7 MG/DL (ref 2.3–4.7)
PLATELET # BLD AUTO: 465 K/MM3 (ref 130–400)
PLATELET BLD QL SMEAR: (no result)
PMV BLD AUTO: 10.2 FL (ref 7.4–10.4)
POTASSIUM SERPL-SCNC: 4 MMOL/L (ref 3.5–4.5)
RBC # BLD AUTO: 4.11 M/MM3 (ref 4.2–5.6)
SODIUM SERPL-SCNC: 136 MMOL/L (ref 136–145)

## 2021-11-24 NOTE — NUR
Patient ambulated indpendently to the ED entrance. Discharge paperwork and
personal belongings with the patient. No further needs expressed.

## 2021-11-24 NOTE — NUR
PT STATES HE TOOK AT HOT SHOWER IS FEELING A LITTLE BIT BETTER THIS AM. BP
RECHECKED. MORE RELAXED. no

## 2021-11-24 NOTE — NUR
Discharge paperwork reviewed with the patient. Patient verbalized an
understanding to follow doctors orders. IV removed, tip intact. No further
needs expressed. Pain medication given as requested. Call light within reach

## 2021-11-24 NOTE — NUR
Patient sitting up in bed eating breakfast. Family at the bedside. A&Ox4.
Reporting pain in left lower back. Pain medication given as requested. IV CDI.
No further needs expressed. Call light within reach

## 2021-11-24 NOTE — NUR
WANDA CUELLAR CALLED. PT IS IN TEARS AND BP IS ELEVATED THIS AM. RATING PAIN 10/10.
WE HAD THE CONVERSATION ABOUT TRADING ONE ADDICTION FOR ANOTHER AND THAT WE
NEEDED TO TRY OTHER ALERNATIVES SUCH AS WALKING, HEAT. POSITION CHANGES. PT
STATES HE HAS TRYED ALL THOSE. INCREASING PO NORCO TO 7.5MG AND WILL GIVEN PRN
HYDROLOSINE FOR BPS.